# Patient Record
Sex: FEMALE | Race: WHITE
[De-identification: names, ages, dates, MRNs, and addresses within clinical notes are randomized per-mention and may not be internally consistent; named-entity substitution may affect disease eponyms.]

---

## 2018-04-24 ENCOUNTER — HOSPITAL ENCOUNTER (OUTPATIENT)
Dept: HOSPITAL 96 - M.MRI | Age: 53
End: 2018-04-24
Attending: ORTHOPAEDIC SURGERY
Payer: COMMERCIAL
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
## 2018-04-24 DIAGNOSIS — M17.12: ICD-10-CM
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
## 2018-04-24 DIAGNOSIS — S83.232A: Primary | ICD-10-CM

## 2018-05-24 LAB
ALBUMIN SERPL-MCNC: 3.8 G/DL (ref 3.4–5)
ALP SERPL-CCNC: 116 U/L (ref 46–116)
ALT SERPL-CCNC: 37 U/L (ref 30–65)
ANION GAP SERPL CALC-SCNC: 6 MMOL/L (ref 7–16)
AST SERPL-CCNC: 22 U/L (ref 15–37)
BILIRUB SERPL-MCNC: 0.3 MG/DL
BILIRUB UR-MCNC: NEGATIVE MG/DL
BUN SERPL-MCNC: 12 MG/DL (ref 7–18)
CALCIUM SERPL-MCNC: 9.3 MG/DL (ref 8.5–10.1)
CHLORIDE SERPL-SCNC: 105 MMOL/L (ref 98–107)
CO2 SERPL-SCNC: 30 MMOL/L (ref 21–32)
COLOR UR: YELLOW
CREAT SERPL-MCNC: 0.7 MG/DL (ref 0.6–1.3)
GLUCOSE SERPL-MCNC: 100 MG/DL (ref 70–99)
HCT VFR BLD CALC: 43.2 % (ref 37–47)
HGB BLD-MCNC: 14.2 GM/DL (ref 12–15)
INR PPP: 1
KETONES UR STRIP-MCNC: NEGATIVE MG/DL
MCH RBC QN AUTO: 29.8 PG (ref 26–34)
MCHC RBC AUTO-ENTMCNC: 32.8 G/DL (ref 28–37)
MCV RBC: 90.9 FL (ref 80–100)
MPV: 10 FL. (ref 7.2–11.1)
PLATELET COUNT*: 238 THOU/UL (ref 150–400)
POTASSIUM SERPL-SCNC: 4.4 MMOL/L (ref 3.5–5.1)
PROT SERPL-MCNC: 7.3 G/DL (ref 6.4–8.2)
PROT UR QL STRIP: NEGATIVE
PROTHROMBIN TIME: 10.1 SECONDS (ref 9.2–11.5)
RBC # BLD AUTO: 4.76 MIL/UL (ref 4.2–5)
RBC # UR STRIP: NEGATIVE /UL
RDW-CV: 14.2 % (ref 10.5–14.5)
SODIUM SERPL-SCNC: 141 MMOL/L (ref 136–145)
SP GR UR STRIP: 1.01 (ref 1–1.03)
URINE CLARITY: CLEAR
URINE GLUCOSE-RANDOM: NEGATIVE
URINE LEUKOCYTES-REFLEX: NEGATIVE
URINE NITRITE-REFLEX: NEGATIVE
UROBILINOGEN UR STRIP-ACNC: 0.2 E.U./DL (ref 0.2–1)
WBC # BLD AUTO: 6.9 THOU/UL (ref 4–11)

## 2018-06-05 ENCOUNTER — HOSPITAL ENCOUNTER (INPATIENT)
Dept: HOSPITAL 96 - M.PRE | Age: 53
LOS: 2 days | Discharge: HOME | DRG: 470 | End: 2018-06-07
Attending: INTERNAL MEDICINE | Admitting: INTERNAL MEDICINE
Payer: COMMERCIAL

## 2018-06-05 VITALS — DIASTOLIC BLOOD PRESSURE: 87 MMHG | SYSTOLIC BLOOD PRESSURE: 149 MMHG

## 2018-06-05 VITALS — WEIGHT: 270 LBS | HEIGHT: 67 IN | BODY MASS INDEX: 42.38 KG/M2

## 2018-06-05 VITALS — SYSTOLIC BLOOD PRESSURE: 135 MMHG | DIASTOLIC BLOOD PRESSURE: 84 MMHG

## 2018-06-05 VITALS — SYSTOLIC BLOOD PRESSURE: 125 MMHG | DIASTOLIC BLOOD PRESSURE: 75 MMHG

## 2018-06-05 DIAGNOSIS — E78.00: ICD-10-CM

## 2018-06-05 DIAGNOSIS — Z88.8: ICD-10-CM

## 2018-06-05 DIAGNOSIS — Z91.040: ICD-10-CM

## 2018-06-05 DIAGNOSIS — F32.9: ICD-10-CM

## 2018-06-05 DIAGNOSIS — Z79.899: ICD-10-CM

## 2018-06-05 DIAGNOSIS — M17.12: Primary | ICD-10-CM

## 2018-06-05 DIAGNOSIS — K21.9: ICD-10-CM

## 2018-06-05 PROCEDURE — 0SRD0J9 REPLACEMENT OF LEFT KNEE JOINT WITH SYNTHETIC SUBSTITUTE, CEMENTED, OPEN APPROACH: ICD-10-PCS | Performed by: ORTHOPAEDIC SURGERY

## 2018-06-05 NOTE — NUR
RECIEVED O.T. EVAL AND TX ORDERS.  WILL DEFER TO P.T. AND NURSING AT THIS
TIME. PLEASE ORDER FURTHER O.T. SERVICES IF NEEDED.

## 2018-06-05 NOTE — NUR
PATIENT REMAINS ALERT AND ORIENTED. PAIN 3/10. HYDROCODONE GIVEN. TOLERATING
DINNER. DRESSING TO LEFT KNEE C/D/I. O2 AT 3L. SAT 97% PER CONT PULSE OX.
SCD'S, POLAR CARE, AND GERALD IN PLACE. FAMILY AT BEDSIDE. BED ALARM SET. WILL
CONTINUE TO MONITOR.

## 2018-06-05 NOTE — NUR
PATIENT TRANSFERRED FROM PACU TO ROOM 112. ALERT AND ORIENTED. PAIN TOLERABLE.
O2 AT 3L. SAT 98% ON 3L. CONT PULSE OX IN PLACE. WATER PROVIDED. DRESSING TO
KNEE DRY AND INTACT. POLAR CARE, GERALD, AND SCD'S IN PLACE. VSS. FAMILY AT
BEDSIDE. ORIENTED TO ROOM AND BED CONTROLS. CALL LIGHT WITHIN REACH. WILL
CONTINUE TO MONITOR.

## 2018-06-06 VITALS — SYSTOLIC BLOOD PRESSURE: 113 MMHG | DIASTOLIC BLOOD PRESSURE: 71 MMHG

## 2018-06-06 VITALS — SYSTOLIC BLOOD PRESSURE: 149 MMHG | DIASTOLIC BLOOD PRESSURE: 68 MMHG

## 2018-06-06 VITALS — SYSTOLIC BLOOD PRESSURE: 118 MMHG | DIASTOLIC BLOOD PRESSURE: 73 MMHG

## 2018-06-06 VITALS — DIASTOLIC BLOOD PRESSURE: 65 MMHG | SYSTOLIC BLOOD PRESSURE: 102 MMHG

## 2018-06-06 VITALS — DIASTOLIC BLOOD PRESSURE: 62 MMHG | SYSTOLIC BLOOD PRESSURE: 111 MMHG

## 2018-06-06 LAB
HCT VFR BLD CALC: 37.7 % (ref 37–47)
HGB BLD-MCNC: 12.5 GM/DL (ref 12–15)

## 2018-06-06 NOTE — NUR
COULD NOT SEE PT.AS SHE WAS IN THE SHOWER. REVIEWED CHART AND SPOKE WITH
NURSING. SHE LIVES WITH HER  AND CHILDREN IN A HOUSE. SHE HAS 10 STAIRS
TO GET IN SIDE AND 13 INSIDE. SHE HAS A WALKER AT HOME AND TOILET RISER. SHE
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 SHE WOULD LIKE HOME HEALTH FOR 2 SEEKS THEN
GO TO [facility]. THERAPY AT [facility] IN [place]. HER PHARMACY IS [facility] ON [address]
CM WILL SEE PT.IN AM.

## 2018-06-06 NOTE — NUR
PATIENT HAS REMAINED ALERT AND ORIENTED X 4 THROUGHOUT THE SHIFT AND RESTING
QUIETLY ON HOURLY ROUNDS. DRESSING LEFT KNEE CLEAN AND DRY WITH HEMOVAC
PRESENT. MEDICATED FOR PAIN X 2 AS OF THIS WRITING TO GOOD EFFECT.  BEDPAN
USED OVERNIGHT WITH ADEQUATE URINE OUTPUT. CPM INITIATED AT HS. VITAL SIGNS
STABLE. CONTINUE TO MONITOR.

## 2018-06-06 NOTE — NUR
PATIENT REMAINS ALERT AND ORIENTED. PAIN CONTROLLED WITH HYDROCODONE AND
OXYIR. DRESSING TO KNEE DRY AND INTACT. POLAR CARE, GERALD, AND SCD IN PLACE.
USED CPM X2 THIS SHIFT -5 TO 85 DEGREEES. VOIDING PER TOILET. NO BM. REFUSED
MILK OF MAG. TRANSFERS AND AMBULATES WITH STANDBY ASSIST. SHOWERED THIS
AFTERNOON- INCISION COVERED WITH PLASTIC BAG. BED/CHAIR ALARM IN USE. CALL
LIGHT WITHIN REACH. WILL CONTINUE TO MONITOR.

## 2018-06-07 VITALS — DIASTOLIC BLOOD PRESSURE: 63 MMHG | SYSTOLIC BLOOD PRESSURE: 106 MMHG

## 2018-06-07 VITALS — SYSTOLIC BLOOD PRESSURE: 104 MMHG | DIASTOLIC BLOOD PRESSURE: 55 MMHG

## 2018-06-07 VITALS — SYSTOLIC BLOOD PRESSURE: 118 MMHG | DIASTOLIC BLOOD PRESSURE: 73 MMHG

## 2018-06-07 VITALS — SYSTOLIC BLOOD PRESSURE: 113 MMHG | DIASTOLIC BLOOD PRESSURE: 68 MMHG

## 2018-06-07 LAB
HCT VFR BLD CALC: 35.4 % (ref 37–47)
HGB BLD-MCNC: 11.7 GM/DL (ref 12–15)

## 2018-06-07 NOTE — NUR
PATIENT HAS REMAINED ALERT AND ORIENTED X 3 THROUGHOUT THE SHIFT AND RESTING
QUIETLY ON HOURLY ROUNDS. UP WITH CGA, WALKER/GAIT BELT. PATIENT IS EVEN ABLE
TO GET SURGICAL LEG UP INTO THE BED INDEPENDENTLY. BM THIS AM AFTER HS MILK OF
MAGNESIA WITH COLACE. VOIDS ADEQUATELY. DRESSING LEFT KNEE CLEAN AND DRY.
VITAL SIGNS STABLE. PROGRESSING WELL TOWARDS DISCHARGE GOALS. CONTINUE TO
MONITOR.

## 2018-06-07 NOTE — NUR
SPOKE WITH PT. MOTHER IN ROOM. PT.READY TO GO HOME TODAY. SHE SAID SHE DID
STAIRS IN THERAPY AND DID WELL. FAMILY ALREADY PICKED UP XARELTO AT PHARMACY.
EXPLAINED RN WILL GIVE HER PRESCRIPTION FOR PAIN MED AT DISCHARGE. SHE CHOSE
TO USE [facility] AT DISCHARGE FOR FIRST 2 WEEKS. DISCUSSED CPM AND
POLAR PACK.  SHE SAID ,SON AND MOM WILL HELP HER AT DISCHARGE. FAXED
REFERRAL AND ORDERS TO [facility].  ALL INFORMATION PLACE
IN DISCHARGE INSTRUCTIONS.

## 2018-06-07 NOTE — NUR
ASSUMED CARES OF PT AT 0700. PT IN BED, BED IN LOW LOCKED POSITION. CALL
BUTTON AND PERSONAL ITEMS IN PT REACH. PT A&O X4, HRRR PER AUSCULTATION,
LCTAB, AFEBRILE, PERRLA, VSS ON RA. EDUCATED ON INCENTIVE SPIROMETER USE.
SMALL BM TODAY. RIGHT WRIST IV PATENT TO FLUSH AND SALINE LOCKED. PAIN WELL
CONTROLLED WITH ORAL PAIN MEDS. PT UP WITH GAIT BELT AND WALKER/PHYSICAL
THERPAY, OT. GERALD HOSE, FOOT PUMPS, POLAR CARE AND CPM IN USE THIS SHIFT. LEFT
KNEE SURGICAL SITE DRESSING C/D/I. PT CLEARED FOR DISCHARGE HOME. IV REMOVED.
PT BELONGINGS PACKED AND TAKEN BY FAMILY. DISCHARGE EDUCATION, STROKE
EDUCATION COMPLETED. PT ESCORTED BY NURSING STAFF VIA  TO CAR WITH FAMILY.
DISCHARGE COMPLETED AT 1758.

## 2018-06-08 NOTE — PATH
[facility] 
[address] [place]  [address]                    PATHOLOGY RPT PROCEDURE       
_______________________________________________________________________________
 
Name:       FIFI BROWN               Room:           [address] [place]  
M.R.#:  [id number]      Account #:      [id number]  
Admission:  06/05/18     Date of Birth:  [birth year]  
Discharge:  06/07/18                   Report #:    [id number]
                                                         Path Case #: [id number]
_______________________________________________________________________________
 
[facility] Accession Number: [id number]
.                                                                01
Material submitted:                                        .
LEFT KNEE BONE AND TISSUE
.                                                                01
Clinical history:                                          .
Left knee degenerative joint disease
.                                                                02
**********************************************************************
Diagnosis:
Left knee bone and tissue, total knee replacement.
- Benign synovium and meniscus and benign bone and cartilage with severe
degenerative changes.
(SANDY:pit; 6/07/2018)
QTP/06/07/2018
**********************************************************************
.                                                                02
Electronically signed:                                     .
Jason Abdalla MD, Pathologist
NPI- [id number]
.                                                                01
Gross description:                                         .
The specimen is received in formalin, labeled "Fifi Brown, left
knee bone and tissue", are multiple fragments of yellow bone with
recognizable portions of the tibial plateau, patella, meniscus (three
fragments, measuring 5.0 x 4.0 x 1.5 cm in aggregate) and rubbery
gray-white soft tissue (multiple fragments, measuring 6.0 x 4.5 x 1.2 cm
in aggregate).  The specimen measures 10.5 x 9.0 x 2.5 in aggregate.  Few
fragments show an irregular articular cartilage with eburnation.
Peripheral osteophytes are present.  Representative sections are submitted
in A1 after decalcification.
.
([facility]; 6/05/2018)
SHS/SHS
.                                                                02
Pathologist provided ICD-10:
M17.12
.                                                                02
CPT                                                        .
765112, [id number]
***Performed at:  01
   [facility]
   [address] [place] [address] [address], [place]  [id number]
   MD Atul Stephens MD Phone:  [phone]
***Performed at:  02
   [place] [address] [place], [place]  [id number]
   MD Jason Abdalla MD Phone:  [phone]

## 2018-06-26 NOTE — OP
[facility] 
[address] [place]  [address]                    OPERATIVE REPORT              
_______________________________________________________________________________
 
Name:       GE BROWN               Room:           [address] [place]  
.R.#:  [id number]      Account #:      [id number]  
Admission:  06/05/18     Attend Phys:    ANUJA Hernandes
Discharge:  06/07/18     Date of Birth:  [birth year]  
         Report #: [id number]
                                                                     [id number]  
_______________________________________________________________________________
THIS REPORT FOR:  //name//                      
 
CC: Titi Enciso
 
DATE OF SERVICE:  06/05/2018
 
 
PREOPERATIVE DIAGNOSIS:  Left knee osteoarthritis.
 
POSTOPERATIVE DIAGNOSIS:  Left knee osteoarthritis.
 
PROCEDURE:  Left total knee arthroplasty.
 
SURGEON:  Aubrey Saini II, DO.
 
FIRST ASSISTANT:  ABDOULAYE Denton.
 
ANESTHESIA:  General endotracheal.
 
ESTIMATED BLOOD LOSS:  50 mL.
 
ANTIBIOTICS:  Ancef preoperatively.
 
DRAINS:  Medium Hemovac.
 
COMPLICATIONS:  None. 
 
CONDITION OF THE PATIENT:  Stable to recovery room.
 
IMPLANTS:  Listed in operative record and progress note.
 
BRIEF HISTORY:  The patient is seen in the preoperative area, preop H and P were
performed.  Site was marked, questions were answered.  Risks and benefits were
discussed with the patient in detail about surgery.  The patient assumed all the
risks and wished to proceed.
 
DESCRIPTION OF PROCEDURE:  The patient was taken to operative suite, placed
supine on the operating table and given appropriate anesthesia.  A well-padded
tourniquet applied to upper thigh, which was inflated to 300 mmHg after
exsanguination.  The operative knee was sterilely prepped and draped.  Surgery
begun by midline incision, is carried down to the subcutaneous tissues.  A
medial parapatellar arthrotomy was performed, carried down to bone.  The patella
was then everted and excess soft tissue removed from the femur.  Femoral cutting
block was then applied, checked with a drop rufus for rotational alignment, pinned
 
 
 
[address]  [address]                    OPERATIVE REPORT              
_______________________________________________________________________________
 
Name:       GE BROWN               Room:           M.112-P    DIS IN  
M.R.#:  [id number]      Account #:      [id number]  
Admission:  06/05/18     Attend Phys:    ANUJA Hernandes
Discharge:  06/07/18     Date of Birth:  [birth year]  
         Report #: [id number]
                                                                     [id number]  
_______________________________________________________________________________
in appropriate position and appropriate cuts were made.  A 4-in-1 cutting block
was then applied, checked for rotational alignment, pinned in appropriate
position and appropriate cuts were made.  Tibia was exposed and the excess
meniscus was removed.  Retractor was placed along the collateral ligaments. 
Tibial cutting block was applied, pinned in appropriate position, checked with
drop rufus for rotational alignment and slope and appropriate cut was made.  The
tibial bone was then removed.  Tibial base plate was then applied, checked for
rotational alignment with the drop rufus and pinned in appropriate position. 
Femur was then applied.  The box cut was reamed.  This was then trialed with the
appropriate spacer, which showed excellent fit and fill and excellent stability
of the knee through all range of motion.  The patella was then reamed in
appropriate fashion and sized to appropriate size.  Three peg holes were
drilled, and it was then trialed and showed to have excellent tracking within
the groove.  These trials were then removed.  Tibia was punched in appropriate
fashion.  Bone ends were cleansed with Pulsavac irrigation.  Cement was mixed
and applied to the final implants.  It was then malleted in position and held
the knee in extension and compressed to allow cement to cure.  After it cured,
excess was removed using [place] and osteotome.  The wound was then copiously
irrigated, and the final spacer was then malleted in position.  Tourniquet was
deflated.  Hemostasis was obtained with electrocautery.  The pain cocktail was
injected.  PRP gel was sprayed through internal aspect of the knee.  The medium
Hemovac drain was then applied.  Capsule was closed with 2 FiberWire and 1
Vicryl in figure-of-eight fashion.  Skin was closed with 2-0 Vicryl and running
3-0 Monocryl.  Dermabond and sterile dressing applied.  Ace wrap and PolarCare
applied.  The patient transferred to recovery in stable condition.  Counts were
correct throughout the procedure.
 
 
 
 
 
 
 
 
 
 
 
 
 
 
 
 
 
 
<ELECTRONICALLY SIGNED>
                                        By:  Aubrey Saini II, DO     
06/26/18     1147
D: 06/07/18 0840_______________________________________
T: 06/07/18 1003Aubrey Saini II, DO        /nt

## 2018-07-05 ENCOUNTER — HOSPITAL ENCOUNTER (OUTPATIENT)
Dept: HOSPITAL 96 - M.RAD | Age: 53
End: 2018-07-05
Attending: ORTHOPAEDIC SURGERY
Payer: COMMERCIAL

## 2018-07-05 DIAGNOSIS — M17.12: Primary | ICD-10-CM

## 2018-07-05 DIAGNOSIS — Z96.652: ICD-10-CM

## 2018-07-05 DIAGNOSIS — M25.462: ICD-10-CM

## 2018-07-12 ENCOUNTER — HOSPITAL ENCOUNTER (OUTPATIENT)
Dept: HOSPITAL 96 - M.MRI | Age: 53
End: 2018-07-12
Attending: ORTHOPAEDIC SURGERY
Payer: COMMERCIAL
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
## 2018-07-12 DIAGNOSIS — M71.21: ICD-10-CM

## 2018-07-12 DIAGNOSIS — M25.461: ICD-10-CM

## 2018-07-12 DIAGNOSIS — M17.11: ICD-10-CM

## 2018-07-12 DIAGNOSIS — S83.231A: Primary | ICD-10-CM
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
## 2018-07-26 LAB
ALBUMIN SERPL-MCNC: 3.5 G/DL (ref 3.4–5)
ALP SERPL-CCNC: 111 U/L (ref 46–116)
ALT SERPL-CCNC: 23 U/L (ref 30–65)
ANION GAP SERPL CALC-SCNC: 7 MMOL/L (ref 7–16)
AST SERPL-CCNC: 14 U/L (ref 15–37)
BACTERIA-REFLEX: (no result) /HPF
BILIRUB SERPL-MCNC: 0.3 MG/DL
BILIRUB UR-MCNC: NEGATIVE MG/DL
BUN SERPL-MCNC: 12 MG/DL (ref 7–18)
CALCIUM SERPL-MCNC: 9.4 MG/DL (ref 8.5–10.1)
CHLORIDE SERPL-SCNC: 106 MMOL/L (ref 98–107)
CO2 SERPL-SCNC: 27 MMOL/L (ref 21–32)
COLOR UR: YELLOW
CREAT SERPL-MCNC: 0.7 MG/DL (ref 0.6–1.3)
GLUCOSE SERPL-MCNC: 98 MG/DL (ref 70–99)
HCT VFR BLD CALC: 37.3 % (ref 37–47)
HGB BLD-MCNC: 12.2 GM/DL (ref 12–15)
INR PPP: 1
KETONES UR STRIP-MCNC: NEGATIVE MG/DL
MCH RBC QN AUTO: 29 PG (ref 26–34)
MCHC RBC AUTO-ENTMCNC: 32.7 G/DL (ref 28–37)
MCV RBC: 88.6 FL (ref 80–100)
MPV: 9.3 FL. (ref 7.2–11.1)
MUCUS: (no result) STRN/LPF
PLATELET COUNT*: 276 THOU/UL (ref 150–400)
POTASSIUM SERPL-SCNC: 4 MMOL/L (ref 3.5–5.1)
PROT SERPL-MCNC: 6.9 G/DL (ref 6.4–8.2)
PROT UR QL STRIP: NEGATIVE
PROTHROMBIN TIME: 10.1 SECONDS (ref 9.2–11.5)
RBC # BLD AUTO: 4.21 MIL/UL (ref 4.2–5)
RBC # UR STRIP: NEGATIVE /UL
RBC #/AREA URNS HPF: (no result) /HPF (ref 0–2)
RDW-CV: 14 % (ref 10.5–14.5)
SODIUM SERPL-SCNC: 140 MMOL/L (ref 136–145)
SP GR UR STRIP: <= 1.005 (ref 1–1.03)
SQUAMOUS: (no result) /LPF (ref 0–3)
URINE CLARITY: CLEAR
URINE GLUCOSE-RANDOM: NEGATIVE
URINE LEUKOCYTES-REFLEX: (no result)
URINE NITRITE-REFLEX: NEGATIVE
URINE WBC-REFLEX: (no result) /HPF (ref 0–5)
UROBILINOGEN UR STRIP-ACNC: 0.2 E.U./DL (ref 0.2–1)
WBC # BLD AUTO: 6.3 THOU/UL (ref 4–11)

## 2018-08-07 ENCOUNTER — HOSPITAL ENCOUNTER (INPATIENT)
Dept: HOSPITAL 96 - M.PRE | Age: 53
LOS: 1 days | Discharge: HOME HEALTH | DRG: 470 | End: 2018-08-08
Attending: INTERNAL MEDICINE | Admitting: INTERNAL MEDICINE
Payer: COMMERCIAL

## 2018-08-07 VITALS — SYSTOLIC BLOOD PRESSURE: 149 MMHG | DIASTOLIC BLOOD PRESSURE: 85 MMHG

## 2018-08-07 VITALS — WEIGHT: 265 LBS | HEIGHT: 67 IN | BODY MASS INDEX: 41.59 KG/M2

## 2018-08-07 VITALS — SYSTOLIC BLOOD PRESSURE: 133 MMHG | DIASTOLIC BLOOD PRESSURE: 78 MMHG

## 2018-08-07 VITALS — DIASTOLIC BLOOD PRESSURE: 83 MMHG | SYSTOLIC BLOOD PRESSURE: 132 MMHG

## 2018-08-07 DIAGNOSIS — Z91.040: ICD-10-CM

## 2018-08-07 DIAGNOSIS — Z96.652: ICD-10-CM

## 2018-08-07 DIAGNOSIS — K21.9: ICD-10-CM

## 2018-08-07 DIAGNOSIS — E66.9: ICD-10-CM

## 2018-08-07 DIAGNOSIS — Z88.8: ICD-10-CM

## 2018-08-07 DIAGNOSIS — Z79.899: ICD-10-CM

## 2018-08-07 DIAGNOSIS — M17.0: Primary | ICD-10-CM

## 2018-08-07 DIAGNOSIS — Z86.718: ICD-10-CM

## 2018-08-07 DIAGNOSIS — Z79.82: ICD-10-CM

## 2018-08-07 DIAGNOSIS — Z79.01: ICD-10-CM

## 2018-08-07 NOTE — NUR
PATIENT REMAINS ALERT AND ORIENTED X4. VITALS REMIAN STABLE ON 2 LITERS 02.
PAIN HAS BEEN MANAGED WITH ORAL MEDICATION. PATIENT HAS SUPPORTIVE FAMILY AT
BEDSIDE. FLUIDS INFUSING AS ORDERED. HOURLY ROUNDS MAINTAINED. CALL LIGHT
WITHIN REACH. NURSING WILL CONTINUE TO MONITOR.

## 2018-08-07 NOTE — NUR
PATIENT ARRIVED TO UNIT AT 1350. ALERT AND ORIENTED X4. ASSESSMENT COMPLETED
AND AS CHARTED. VSS ON 2 LITERS 02 WITH CAPNO IN PLACE. NO COMPLAINTS OF PAIN,
NAUSEA OR SOA. PATIENT AND FAMILY ORIENTED TO ROOM. FALL CONTRACT SIGNED AND
PATIENT VOICED UNDERSTANDING ABOUT FALL SAFETY AND CALLING FOR ASSISTANCE.
CALL LIGHT WITHIN REACH. NURSING WILL CONTINUE TO MONITOR.

## 2018-08-07 NOTE — NUR
I have reviewed the documentation by LONA ESTRADA
from 8/7/18 to 08/07/18 and I concur with
it. VIRGIL, RYLAN

## 2018-08-08 VITALS — SYSTOLIC BLOOD PRESSURE: 131 MMHG | DIASTOLIC BLOOD PRESSURE: 77 MMHG

## 2018-08-08 VITALS — SYSTOLIC BLOOD PRESSURE: 113 MMHG | DIASTOLIC BLOOD PRESSURE: 70 MMHG

## 2018-08-08 VITALS — DIASTOLIC BLOOD PRESSURE: 77 MMHG | SYSTOLIC BLOOD PRESSURE: 131 MMHG

## 2018-08-08 VITALS — SYSTOLIC BLOOD PRESSURE: 114 MMHG | DIASTOLIC BLOOD PRESSURE: 70 MMHG

## 2018-08-08 LAB
HCT VFR BLD CALC: 37.7 % (ref 37–47)
HGB BLD-MCNC: 12.1 GM/DL (ref 12–15)

## 2018-08-08 PROCEDURE — 0SRC0J9 REPLACEMENT OF RIGHT KNEE JOINT WITH SYNTHETIC SUBSTITUTE, CEMENTED, OPEN APPROACH: ICD-10-PCS | Performed by: ORTHOPAEDIC SURGERY

## 2018-08-08 NOTE — NUR
PATIENT ALERT AND ORIENTED X 4. VITALS STABLE. ON 2L OF OXYGEN. RIGHT KNEE
DRESSING C/D/I. CURRENTLY ON CPM. TOLERATED CPM WELL AT HS. PAIN MEDICATION
GIVEN APPROXIMATELY EVERY TWO HOURS, EFFECTIVE. HEMOVAC IN PLACE. UP WITH
ASSIST X 1 TO BSC. FLUIDS INFUSING PER ORDER. HOURLY ROUNDS. BED ALARM IN USE.
NURSING WILL CONTINUE TO MONITOR.

## 2018-08-08 NOTE — NUR
ASSUMED CARE OF PATIENT AFTER MORNING REPORT AT APPROX 0720. ALERT AND
ORIENTED X4. ASSESSMENT COMPLETED AND AS CHARTED. VSS ON ROOM AIR. PATIENT HAD
NO COMPLAINTS OF NAUSEA OR SOA. PAIN HAS BEEN MANAGED WITH ORAL PAIN
MEDICATION. IV FLUIDS DISCONTINUED. ANTIBIOTICS INFUSED AS ORDERED. IV
DISCONTINUED. PATIENT WORKED VERY WELL WITH THERAPY AND DISCHARGED HOME WITH
HOME HEALTH AT 1500. ALL PERSONAL BELONGINGS, PRESCRIPTIONS AND DISCHARGE
INFORMATION SENT WITH PATIENT UPON DISCHARGE.

## 2018-08-08 NOTE — OP
[facility] 
[address] [place]  [address]                    OPERATIVE REPORT              
_______________________________________________________________________________
 
Name:       GE BROWN               Room:           M.113-P    DIS IN  
M.R.#:  [id number]      Account #:      [id number]  
Admission:  08/07/18     Attend Phys:    ANUJA Hernandes
Discharge:  08/08/18     Date of Birth:  [birth year]  
         Report #: [id number]
                                                                     [id number]  
_______________________________________________________________________________
THIS REPORT FOR:  //name//                      
 
CC: Titi Enciso
 
DATE OF SERVICE:  08/07/2018
 
 
PREOPERATIVE DIAGNOSIS:  Right knee osteoarthritis.
 
POSTOPERATIVE DIAGNOSIS:  Right knee osteoarthritis.
 
PROCEDURE:  Right total knee arthroplasty.
 
SURGEON:  Aubrey Saini II, DO
 
FIRST ASSISTANT:  ABDOULAYE Denton
 
ANESTHESIA:  General endotracheal.
 
ESTIMATED BLOOD LOSS:  50 mL.
 
ANTIBIOTICS:  Ancef preoperatively.
 
DRAINS:  Medium Hemovac.
 
COMPLICATIONS:  None.
 
CONDITION OF PATIENT:  Stable to recovery room.
 
IMPLANTS:  Listed in the operative record and progress note.
 
BRIEF HISTORY:  The patient was seen in the preoperative area.  Preoperative H
and P was performed.  The patient's site was marked.  Questions were answered. 
Risks and benefits were discussed with the patient in detail about surgery.  The
patient wished to proceed and assumed all risks.
 
DESCRIPTION OF PROCEDURE:  The patient was taken to the operative suite, placed
supine on the operating table and given appropriate anesthesia.  The patient had
a well-padded tourniquet applied to the upper thigh, which was inflated to 300
mmHg after gravity exsanguination.  The operative knee was sterilely prepped and
draped.  Surgery began by a midline incision, was carried down to the
subcutaneous tissues.  A medial parapatellar arthrotomy was performed and
carried down to bone.  The patella was then everted and excess soft tissue
removed from the femur.  The femoral cutting block was then applied, checked
 
 
 
[facility] 
[address] [place]  [address]                    OPERATIVE REPORT              
_______________________________________________________________________________
 
Name:       GE BROWN               Room:           M.113-P    DIS IN  
M.R.#:  [id number]      Account #:      [id number]  
Admission:  08/07/18     Attend Phys:    ANUJA Hernandes
Discharge:  08/08/18     Date of Birth:  [birth year]  
         Report #: [id number]
                                                                     [id number]  
_______________________________________________________________________________
with a drop rufus for rotational alignment, pinned into appropriate position and
appropriate cuts were made.  A 4-in-1 cutting block was then applied, checked
for rotational alignment, pinned into appropriate position and appropriate cuts
were made.  The tibia was then exposed.  The excess meniscus was removed. 
Retractor was placed along the collateral ligaments.  The tibial cutting block
was then applied, pinned into appropriate position, checked with a drop rufus for
rotational alignment and slope and appropriate cut was made.  The tibial bone
was removed.  The tibial base plate was then applied, checked for rotational
alignment with a drop rufus and pinned into appropriate position.  Femur was then
applied and box cut was reamed.  This was then trialed with appropriate spacer,
which showed excellent fit and fill and excellent stability of the knee through
all range of motion.  The patella was then reamed in appropriate fashion and
sized to appropriate size.  Three peg holes were drilled and it was then
trialled and shown to have excellent flexion and extension and excellent
tracking of the patella within the groove.  These trials were then removed.  The
tibia was punched in appropriate fashion.  Bone ends were cleansed with Pulsavac
irrigation and cement was mixed and applied to the final implants.  These were
then malleted into position and held the knee in extension and compressed to
allow cement to cure.  After it cured, excess was removed utilizing a [place] and
osteotome.  The wound was then copiously irrigated and final spacer was malleted
into position.  Tourniquet was deflated.  Hemostasis was maintained with
electrocautery.  A pain cocktail was injected.  PRP gel was sprayed throughout
the internal aspects of the knee.  Medium Hemovac drain was then applied.  The
capsule was closed with #2 FiberWire and #1 Vicryl in figure-of-eight fashion. 
Skin was closed with 2-0 Vicryl and a running 3-0 Monocryl.  Dermabond and
sterile dressing was then applied.  Ace wrap and PolarCare were applied.  The
patient transported to the recovery in stable condition.  Counts were correct
____.
 
 
 
 
 
 
 
 
 
 
 
 
 
 
 
 
<ELECTRONICALLY SIGNED>
                                        By:  Aubrey Saini II, DO     
08/08/18     2246
D: 08/08/18 1254_______________________________________
T: 08/08/18 1409Robangelito Saini II, DO        /nt

## 2018-08-08 NOTE — NUR
CALLED IN XARELTO PRESCRIPTION,AS WRITTEN TO PT.'S PHARMACY. COPAY IS $0.
PT.ASLEEP IN CHAIR. FAMILY IN ROOM. AWAKENDED PT. AND INFORMED OF NO COPAY.
SHE SAID SHE FEELS READY TO GO HOME TODAY. SHE HAS A WALKER. DISCUSSED CPM AND
POLAR MINAL. SHE LIKE [facility] LAST TIME WHEN SHE HAD HER OTHER KNEE DONE AND
WANTS TO USE THEM AGAIN. FAXED ORDERS AND REFERRAL TO [facility].
[facility] CALLED BACK AND SAID THEY WOULD ACCEPT PT.ON SERVICE.

## 2019-12-20 ENCOUNTER — HOSPITAL ENCOUNTER (OUTPATIENT)
Dept: HOSPITAL 96 - M.SUR | Age: 54
Discharge: HOME | End: 2019-12-20
Attending: ORTHOPAEDIC SURGERY
Payer: COMMERCIAL

## 2019-12-20 DIAGNOSIS — M19.011: ICD-10-CM

## 2019-12-20 DIAGNOSIS — Z98.890: ICD-10-CM

## 2019-12-20 DIAGNOSIS — Z79.899: ICD-10-CM
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
## 2019-12-20 DIAGNOSIS — M75.41: ICD-10-CM

## 2019-12-20 DIAGNOSIS — Z79.82: ICD-10-CM

## 2019-12-20 DIAGNOSIS — M17.0: ICD-10-CM

## 2019-12-20 DIAGNOSIS — Z79.891: ICD-10-CM

## 2019-12-20 DIAGNOSIS — Z88.8: ICD-10-CM
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
## 2019-12-20 DIAGNOSIS — Z91.040: ICD-10-CM

## 2019-12-20 DIAGNOSIS — S43.491A: ICD-10-CM

## 2019-12-20 DIAGNOSIS — M75.101: Primary | ICD-10-CM

## 2020-11-30 ENCOUNTER — HOSPITAL ENCOUNTER (OUTPATIENT)
Dept: HOSPITAL 96 - M.ULTRA | Age: 55
End: 2020-11-30
Attending: FAMILY MEDICINE
Payer: COMMERCIAL

## 2020-11-30 DIAGNOSIS — M79.605: Primary | ICD-10-CM
